# Patient Record
Sex: FEMALE | Race: WHITE | Employment: FULL TIME | URBAN - METROPOLITAN AREA
[De-identification: names, ages, dates, MRNs, and addresses within clinical notes are randomized per-mention and may not be internally consistent; named-entity substitution may affect disease eponyms.]

---

## 2018-03-08 ENCOUNTER — TELEPHONE (OUTPATIENT)
Dept: PERINATAL CARE | Facility: CLINIC | Age: 38
End: 2018-03-08

## 2018-03-09 ENCOUNTER — ROUTINE PRENATAL (OUTPATIENT)
Dept: PERINATAL CARE | Facility: CLINIC | Age: 38
End: 2018-03-09
Payer: COMMERCIAL

## 2018-03-09 ENCOUNTER — OFFICE VISIT (OUTPATIENT)
Dept: PERINATAL CARE | Facility: CLINIC | Age: 38
End: 2018-03-09
Payer: COMMERCIAL

## 2018-03-09 VITALS
HEIGHT: 62 IN | WEIGHT: 152.4 LBS | SYSTOLIC BLOOD PRESSURE: 106 MMHG | DIASTOLIC BLOOD PRESSURE: 50 MMHG | BODY MASS INDEX: 28.05 KG/M2 | HEART RATE: 63 BPM

## 2018-03-09 DIAGNOSIS — Z36.82 ENCOUNTER FOR ANTENATAL SCREENING FOR NUCHAL TRANSLUCENCY: Primary | ICD-10-CM

## 2018-03-09 DIAGNOSIS — O09.521 MATERNAL AGE 35+, MULTIGRAVIDA, FIRST TRIMESTER: Primary | ICD-10-CM

## 2018-03-09 DIAGNOSIS — Z3A.12 12 WEEKS GESTATION OF PREGNANCY: ICD-10-CM

## 2018-03-09 DIAGNOSIS — O09.511 ELDERLY PRIMIGRAVIDA IN FIRST TRIMESTER: ICD-10-CM

## 2018-03-09 PROCEDURE — 76813 OB US NUCHAL MEAS 1 GEST: CPT | Performed by: OBSTETRICS & GYNECOLOGY

## 2018-03-09 PROCEDURE — 76801 OB US < 14 WKS SINGLE FETUS: CPT | Performed by: OBSTETRICS & GYNECOLOGY

## 2018-03-09 PROCEDURE — 99201 PR OFFICE OUTPATIENT NEW 10 MINUTES: CPT | Performed by: OBSTETRICS & GYNECOLOGY

## 2018-03-09 NOTE — PROGRESS NOTES
[address] [address]: Ms Kyle Banks was seen today at 12w3d for nuchal translucency ultrasound  See ultrasound report under "OB Procedures" tab  Please don't hesitate to contact our office with any concerns or questions    Mary Kay Rojas MD

## 2018-03-09 NOTE — PROGRESS NOTES
Genetic Counseling Note        Neisha Mccarthy     Appointment Date:  3/9/2018  Referred By: Cammie Baird  YOB: 1980  Partner:  Sharon Leggett    Pregnancy History:   Estimated Date of Delivery: 18   Estimated Gestational Age: 16 weeks 3 days     Genetic Counseling:advanced maternal age    Mick Greene is a(n) 30 y o  female who is here to discuss maternal age related risk for aneuploidy    Issues Discussed:  Average population risk: 3-4% in the average pregnancy of serious condition or birth defect  2-3% risk of mental retardation  Not all detected by prenatal testing  Chromosomal: non-disjunction 307 risk for Down syndrome, 148 risk overall at age 41 at delivery  Other disorders/disease: SMA and Bloom syndrome based on carrier screening test results  Maternal age    Options Discussed:    Amniocentesis: risks and limitations discussed  CVS: risks and limitations discussed  Ethnic screening discussed: clinical and genetic basis of SMA and Bloom syndrome  Level II ultrasound to screen for structural anomalies  Nuchal translucency/1st trimester serum screen: goals and limitations discussed  Cell free fetal DNA testing    Additional Information / Impression / Plan / Tests Ordered:  Mick Greene presents for genetic counseling to discuss maternal age related risk for aneuploidy  After discussing the available prenatal diagnostic and screening procedures she elected to decline prenatal diagnostic testing at this time  She did opt to move forward with cell free DNA testing  Blood will be drawn following the genetic counseling session and sent to [facility]  In addition she is having nuchal translucency ultrasound following genetic counseling session and is planning to pursue MSAFP screening at 16 weeks and level 2 ultrasound evaluation at 20 weeks      During our counseling session histories were taken on the patient's family and her 's family both of which were negative for any prior known cases of intellectual disability, birth defects or single gene disorders  Jeremy Britton reports that she and her  are both of [place] descent with no known Worship ancestry  They previously had panethnic carrier screening performed through [facility]  She provided copies of those test results which indicate that 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 her  is a carrier for Bloom syndrome  With Myriam's negative Gonzalez syndrome test results their residual risk for having a child affected with this condition is 7/46,623 and with Eliseo's negative SMA carrier screening results their residual risk for having an child affected with that condition is 1/3,336  They were both negative for all other conditions tested  Lastly, we discussed the fact that it is important to keep in mind that everyone in the general population regardless of age, family history, or medical background has approximately a 3% risk of having a child with some type of her defect, genetic disease or intellectual disability  Currently there are no tests available to rule out all birth defects or health problems            Time spent with Genetic Counselor: 45 minutes

## 2018-03-09 NOTE — PATIENT INSTRUCTIONS
Please return in 7-8 weeks for anatomy ultrasound ("Level 2")  Remember to get your AFP test drawn at the appropriate time (you will receive a reminder in the mail)

## 2018-03-19 ENCOUNTER — TELEPHONE (OUTPATIENT)
Dept: PERINATAL CARE | Facility: CLINIC | Age: 38
End: 2018-03-19

## 2018-03-19 NOTE — TELEPHONE ENCOUNTER
Called patient with cell free DNA test results that were negative for Trisomy 21, 18 and 13  Told gender (female) at patient request  Informed patient of MSAFP screening  TRF to arrive in the mail

## 2018-04-04 ENCOUNTER — APPOINTMENT (OUTPATIENT)
Dept: LAB | Facility: CLINIC | Age: 38
End: 2018-04-04
Payer: COMMERCIAL

## 2018-04-04 ENCOUNTER — TRANSCRIBE ORDERS (OUTPATIENT)
Dept: LAB | Facility: CLINIC | Age: 38
End: 2018-04-04

## 2018-04-04 DIAGNOSIS — Z36.9 RESEARCH REQUESTED ANTENATAL ULTRASOUND SCAN: Primary | ICD-10-CM

## 2018-04-04 DIAGNOSIS — Z33.1 PREGNANT STATE, INCIDENTAL: ICD-10-CM

## 2018-04-04 PROCEDURE — 82105 ALPHA-FETOPROTEIN SERUM: CPT

## 2018-04-04 PROCEDURE — 36415 COLL VENOUS BLD VENIPUNCTURE: CPT

## 2018-04-07 LAB
2ND TRIMESTER 4 SCREEN SERPL-IMP: NORMAL
AFP ADJ MOM SERPL: 0.89
AFP INTERP AMN-IMP: NORMAL
AFP INTERP SERPL-IMP: NORMAL
AFP INTERP SERPL-IMP: NORMAL
AFP SERPL-MCNC: 28.8 NG/ML
AGE AT DELIVERY: 36 YEARS
GA METHOD: NORMAL
GA: 16 WEEKS
IDDM PATIENT QL: NO
MULTIPLE PREGNANCY: NO
NEURAL TUBE DEFECT RISK FETUS: NORMAL %

## 2018-04-11 ENCOUNTER — TELEPHONE (OUTPATIENT)
Dept: PERINATAL CARE | Facility: CLINIC | Age: 38
End: 2018-04-11

## 2019-09-20 ENCOUNTER — TRANSCRIBE ORDERS (OUTPATIENT)
Dept: PERINATAL CARE | Facility: CLINIC | Age: 39
End: 2019-09-20

## 2019-09-20 DIAGNOSIS — O09.899 SUPERVISION OF OTHER HIGH RISK PREGNANCIES, UNSPECIFIED TRIMESTER: Primary | ICD-10-CM

## 2019-10-21 ENCOUNTER — ROUTINE PRENATAL (OUTPATIENT)
Dept: PERINATAL CARE | Facility: OTHER | Age: 39
End: 2019-10-21
Payer: COMMERCIAL

## 2019-10-21 VITALS
BODY MASS INDEX: 27.46 KG/M2 | HEART RATE: 77 BPM | SYSTOLIC BLOOD PRESSURE: 122 MMHG | WEIGHT: 149.2 LBS | DIASTOLIC BLOOD PRESSURE: 78 MMHG | HEIGHT: 62 IN

## 2019-10-21 DIAGNOSIS — Z3A.12 12 WEEKS GESTATION OF PREGNANCY: ICD-10-CM

## 2019-10-21 DIAGNOSIS — O09.891 SHORT INTERVAL BETWEEN PREGNANCIES COMPLICATING PREGNANCY, ANTEPARTUM, FIRST TRIMESTER: ICD-10-CM

## 2019-10-21 DIAGNOSIS — O09.521 ELDERLY MULTIGRAVIDA, FIRST TRIMESTER: Primary | ICD-10-CM

## 2019-10-21 PROCEDURE — 76813 OB US NUCHAL MEAS 1 GEST: CPT | Performed by: OBSTETRICS & GYNECOLOGY

## 2019-10-21 PROCEDURE — 76801 OB US < 14 WKS SINGLE FETUS: CPT | Performed by: OBSTETRICS & GYNECOLOGY

## 2019-10-21 PROCEDURE — 99241 PR OFFICE CONSULTATION NEW/ESTAB PATIENT 15 MIN: CPT | Performed by: OBSTETRICS & GYNECOLOGY

## 2019-10-21 NOTE — PROGRESS NOTES
Thank you very much for your kind referral of Mariel Rosamaria for fetal ultrasound evaluation and MFM consult on 2019  Mónica Liang is a 37-year-old  white female who is currently at 15 and 6/7 weeks gestation by earlier first-trimester ultrasound dating and an estimated due date of 2020  She presents for the indication of advanced maternal age  Myriam's prenatal course so far has been unremarkable  She has no complaints  She denies vaginal bleeding  She recently received the influenza vaccineAmanda has a history of a prior vacuum assisted vaginal delivery at term 13 months ago following an uncomplicated prenatal course  She delivered a 6 lb 8 oz baby girl, currently healthy  She also has a history of one first-trimester spontaneous pregnancy loss  Her past medical and surgical histories are unremarkable  She takes no medication with the exception of a prenatal vitamin on a daily basis and has no known drug allergy  She denies tobacco, alcohol, or illicit drug use during the pregnancy  Her dad has hypertension  There is no other first-degree family member with a diagnosis of hypertension, diabetes, venous thromboembolism, or preeclampsia  Her sister has bilateral congenital hearing loss  The family genetic history is otherwise negative with respect to genetic abnormalities, birth defects, or mental retardation  Today's ultrasound findings and suggested follow-up were discussed in detail with Mónica Liang  At age 42, her risk for a live-born baby with Down syndrome is one in 5, with a risk for a live-born baby with any chromosomal abnormality of one in 80  We discussed that definitive prenatal diagnosis is possible only with genetic amniocentesis or chorionic villous sampling, and discussed the small procedure-related risk for pregnancy loss in each case    We discussed the option of genetic screening using cell free DNA analysis which is not diagnostic, but which has a sensitivity for identification of Down syndrome and 99%  Ana Paula Contreras opted for genetic screening using cell free DNA analysis  A venous blood draw was obtained today  Results will be available in about 10 days  MSAFP screening is recommended at 16 to 20 weeks gestation  She will return for detailed MFM fetal anatomy assessment at 20 weeks gestation  Myriam's short interpregnancy interval is associated with an increased risk for adverse outcomes including  birth and fetal growth restriction  Clinical awareness should be maintained in this regard  The face to face time, in addition to time spent discussing ultrasound results, was approximately 15 minutes, greater than 50% of which was spent during counseling and coordination of care

## 2019-10-21 NOTE — PROGRESS NOTES
Pricilla Benites drawn on patient at this time in left arm  Patient declines to call  prior to blood draw  She states that she had her last child 13 months ago and nothing has changed since and it was covered then  I informed her that there could be high costs associated with the test  She verbalizes understanding and declines any questions or concerns at this time

## 2019-10-28 ENCOUNTER — TELEPHONE (OUTPATIENT)
Dept: PERINATAL CARE | Facility: CLINIC | Age: 39
End: 2019-10-28

## 2019-10-28 NOTE — TELEPHONE ENCOUNTER
Telephone call to patient at number listed on communication consent  Reported cell free fetal DNA test results which were screen negative for trisomy 21, 18 and 13  Patient requested a 2nd phone call to her voicemail to report gender (male) so that she and her  could find out together  Placed 2nd phone call reported gender and indicated that will be confirmed at the time of level 2 ultrasound  [facility]

## 2019-11-25 ENCOUNTER — TELEPHONE (OUTPATIENT)
Dept: PERINATAL CARE | Facility: CLINIC | Age: 39
End: 2019-11-25

## 2019-11-25 NOTE — TELEPHONE ENCOUNTER
Telephone call to patient  Left message reporting MSAFP test results that were screen negative with modified risk for open neural tube defects of 1/6,000  Patient encouraged to call back with any questions

## 2019-12-13 ENCOUNTER — ROUTINE PRENATAL (OUTPATIENT)
Dept: PERINATAL CARE | Facility: CLINIC | Age: 39
End: 2019-12-13
Payer: COMMERCIAL

## 2019-12-13 VITALS
DIASTOLIC BLOOD PRESSURE: 54 MMHG | HEIGHT: 62 IN | BODY MASS INDEX: 28.3 KG/M2 | HEART RATE: 67 BPM | WEIGHT: 153.8 LBS | SYSTOLIC BLOOD PRESSURE: 96 MMHG

## 2019-12-13 DIAGNOSIS — Z3A.20 20 WEEKS GESTATION OF PREGNANCY: ICD-10-CM

## 2019-12-13 DIAGNOSIS — Z36.86 ENCOUNTER FOR ANTENATAL SCREENING FOR CERVICAL LENGTH: ICD-10-CM

## 2019-12-13 DIAGNOSIS — O09.522 ELDERLY MULTIGRAVIDA, SECOND TRIMESTER: Primary | ICD-10-CM

## 2019-12-13 PROCEDURE — 76817 TRANSVAGINAL US OBSTETRIC: CPT | Performed by: OBSTETRICS & GYNECOLOGY

## 2019-12-13 PROCEDURE — 99212 OFFICE O/P EST SF 10 MIN: CPT | Performed by: OBSTETRICS & GYNECOLOGY

## 2019-12-13 PROCEDURE — 76811 OB US DETAILED SNGL FETUS: CPT | Performed by: OBSTETRICS & GYNECOLOGY

## 2019-12-13 RX ORDER — ASCORBIC ACID 500 MG
500 TABLET ORAL DAILY
COMMUNITY

## 2019-12-13 NOTE — PROGRESS NOTES
Please refer to the [facility] ultrasound report in Ob Procedures for additional information regarding the visit to the [facility]  today

## 2019-12-13 NOTE — PROGRESS NOTES
A transvaginal ultrasound was performed  Sonographer note on use of High Level Disinfection Process (Trophon) for transvaginal probe#  1  used, serial # [id number]    Shira Vargas RDMS

## 2020-03-06 ENCOUNTER — ULTRASOUND (OUTPATIENT)
Dept: PERINATAL CARE | Facility: CLINIC | Age: 40
End: 2020-03-06
Payer: COMMERCIAL

## 2020-03-06 VITALS
SYSTOLIC BLOOD PRESSURE: 114 MMHG | HEIGHT: 62 IN | BODY MASS INDEX: 31.06 KG/M2 | DIASTOLIC BLOOD PRESSURE: 58 MMHG | HEART RATE: 75 BPM | WEIGHT: 168.8 LBS

## 2020-03-06 DIAGNOSIS — Z3A.32 32 WEEKS GESTATION OF PREGNANCY: ICD-10-CM

## 2020-03-06 DIAGNOSIS — O09.523 ELDERLY MULTIGRAVIDA, THIRD TRIMESTER: Primary | ICD-10-CM

## 2020-03-06 PROCEDURE — 76816 OB US FOLLOW-UP PER FETUS: CPT | Performed by: OBSTETRICS & GYNECOLOGY

## 2020-03-06 NOTE — LETTER
March 6, 2020     [address]    Patient: Michelle Saldana   YOB: 1980   Date of Visit: 3/6/2020       Dear Dr Dominique Carlton: Thank you for referring Michelle Saldana to me for evaluation  Below are my notes for this consultation  If you have questions, please do not hesitate to call me  I look forward to following your patient along with you  Sincerely,        Denver Velasquez MD        CC: No Recipients  Denver Velasquez MD  3/6/2020  2:35 PM  Sign at close encounter  Please see her ultrasound report in a separate report    Denver Velasquez MD

## 2020-04-23 LAB
CREAT ?TM UR-SCNC: 76 UMOL/L
EXT PROTEIN URINE: 16
PROT/CREAT UR: 0.21 MG/G{CREAT}

## 2025-08-04 PROBLEM — N39.3 STRESS INCONTINENCE IN FEMALE: Status: ACTIVE | Noted: 2025-08-04

## 2025-08-04 PROBLEM — Z97.5 PRESENCE OF (INTRAUTERINE) CONTRACEPTIVE DEVICE: Status: ACTIVE | Noted: 2025-08-04

## 2025-08-20 ENCOUNTER — TELEPHONE (OUTPATIENT)
Age: 45
End: 2025-08-20